# Patient Record
Sex: FEMALE | Race: WHITE | URBAN - NONMETROPOLITAN AREA
[De-identification: names, ages, dates, MRNs, and addresses within clinical notes are randomized per-mention and may not be internally consistent; named-entity substitution may affect disease eponyms.]

---

## 2022-09-28 ENCOUNTER — CLINICAL DOCUMENTATION (OUTPATIENT)
Dept: INTERNAL MEDICINE CLINIC | Age: 37
End: 2022-09-28

## 2022-09-28 NOTE — PROGRESS NOTES
Patient came to the office to complete new patient screening. Sw completed screening and staffed with Michael Berger CNP,  Patient will be seen after lunch.

## 2022-09-28 NOTE — PROGRESS NOTES
Sw attempted to contact patient due to her not attending her appointment today. Sw did not leave a voicemail due to it not being the patient on the voicemail. Sw will continue to try to reach out to patient.

## 2022-09-28 NOTE — PROGRESS NOTES
Patient presented to the office with a friend. [facility] completed a screening and staffed with Yousuf Foster CNP. Patient was approved to be on the schedule for after lunch. Sw informed patient of being seen after lunch. Patient reports that she has been at the [facility] since Sunday and was not started on medication.

## 2022-10-07 ENCOUNTER — CLINICAL DOCUMENTATION (OUTPATIENT)
Dept: INTERNAL MEDICINE CLINIC | Age: 37
End: 2022-10-07

## 2022-10-07 NOTE — PROGRESS NOTES
Sw attempted to contact patient due to missing her initial intake with Carmen Cali, Patients phone voicemail states another name, therefore sw did not feel comfortable leaving a voicemail.